# Patient Record
Sex: MALE | Race: AMERICAN INDIAN OR ALASKA NATIVE
[De-identification: names, ages, dates, MRNs, and addresses within clinical notes are randomized per-mention and may not be internally consistent; named-entity substitution may affect disease eponyms.]

---

## 2017-07-19 LAB
ALBUMIN SERPL-MCNC: 4.4 G/DL (ref 3.9–5)
ALBUMIN/GLOB SERPL: 1.3 %
ALP SERPL-CCNC: 40 UNITS/L (ref 35–129)
ALT SERPL-CCNC: 9 UNITS/L (ref 7–56)
ANION GAP SERPL CALC-SCNC: 22 MMOL/L
BASOPHILS NFR BLD AUTO: 0.6 % (ref 0–1.8)
BILIRUB SERPL-MCNC: 0.7 MG/DL (ref 0.1–1.2)
BUN SERPL-MCNC: 20 MG/DL (ref 9–20)
BUN/CREAT SERPL: 16.66 %
CALCIUM SERPL-MCNC: 10 MG/DL (ref 8.4–10.2)
CHLORIDE SERPL-SCNC: 99.1 MMOL/L (ref 98–107)
CO2 SERPL-SCNC: 26 MMOL/L (ref 22–30)
EOSINOPHIL NFR BLD AUTO: 0.6 % (ref 0–4.3)
GLUCOSE SERPL-MCNC: 96 MG/DL (ref 75–100)
HCT VFR BLD CALC: 48.7 % (ref 35.5–45.6)
HGB BLD-MCNC: 16.6 GM/DL (ref 11.8–15.2)
MCH RBC QN AUTO: 31 PG (ref 28–32)
MCHC RBC AUTO-ENTMCNC: 34 % (ref 32–34)
MCV RBC AUTO: 90 FL (ref 84–94)
PLATELET # BLD: 164 K/MM3 (ref 140–440)
POTASSIUM SERPL-SCNC: 5.7 MMOL/L (ref 3.6–5)
PROT SERPL-MCNC: 7.7 G/DL (ref 6.3–8.2)
RBC # BLD AUTO: 5.39 M/MM3 (ref 3.65–5.03)
SODIUM SERPL-SCNC: 141 MMOL/L (ref 137–145)
WBC # BLD AUTO: 7.8 K/MM3 (ref 4.5–11)

## 2017-07-19 NOTE — CAT SCAN REPORT
FINAL REPORT



PROCEDURE:  CT HEAD/BRAIN WO CON



TECHNIQUE:  Computerized tomography of the head was performed

without contrast material. 



HISTORY:  ams 



COMPARISON:  No prior studies are available for comparison.



FINDINGS:  

Skull and scalp: Normal.



Paranasal sinuses: Diffuse mucosal thickening is noted involving

bilateral frontal and ethmoid sinuses..



Ventricles and subarachnoid spaces: Normal.



Cerebrum: No evidence of hemorrhage, acute infarction or mass .



Cerebellum and brainstem: No evidence of hemorrhage, acute

infarction or mass.



Vasculature: Normal.



Comments: None.



IMPRESSION:  

No acute intracranial abnormality.



Chronic frontal and ethmoid sinusitis

## 2017-07-19 NOTE — EMERGENCY DEPARTMENT REPORT
ED Psych HPI





- General


Chief Complaint: Psych


Stated Complaint: AMS


Time Seen by Provider: 07/19/17 19:12


Source: police, EMS


Mode of arrival: Stretcher





- History of Present Illness


MD Complaint: other (acute psychosis)


-: Gradual


Associated Psychiatric Symptoms: other


Quality: intermittent


Improves With: none


Associated Symptoms: denies other symptoms


If Self Harm: other


Details of Plan: Patient was incarcerated, was brought to ED for acting 

strange.  Has not eaten or drank since he was arrested according to the 

officers 2 weeks ago.  Has a history of schizophrenia, is staring in the blank, 

not interacting with other people.





- Related Data


 Home Medications











 Medication  Instructions  Recorded  Confirmed  Last Taken


 


Unobtainable  07/20/17 07/20/17 Unknown











 Allergies











Allergy/AdvReac Type Severity Reaction Status Date / Time


 


No Known Allergies Allergy   Verified 07/19/17 19:46














ED Review of Systems


ROS: 


Stated complaint: AMS


Other details as noted in HPI





Comment: Unobtainable due to pts medical conditions





ED Past Medical Hx





- Past Medical History


Hx Psychiatric Treatment: Yes





- Surgical History


Past Surgical History?: No





- Family History


Family history: hypertension





- Social History


Smoking Status: Unknown if ever smoked





- Medications


Home Medications: 


 Home Medications











 Medication  Instructions  Recorded  Confirmed  Last Taken  Type


 


Unobtainable  07/20/17 07/20/17 Unknown History














ED Physical Exam





- General


Limitations: Altered Mental Status


General appearance: alert





- Head


Head exam: Present: atraumatic





- Eye


Eye exam: Present: normal appearance, PERRL, EOMI


Pupils: Present: normal accommodation





- ENT


ENT exam: Present: normal exam, normal orophraynx





- Neck


Neck exam: Present: normal inspection





- Respiratory


Respiratory exam: Present: normal lung sounds bilaterally





- Cardiovascular


Cardiovascular Exam: Present: regular rate, normal rhythm





- GI/Abdominal


GI/Abdominal exam: Present: soft





- Extremities Exam


Extremities exam: Present: normal inspection





- Back Exam


Back exam: Present: normal inspection





- Neurological Exam


Neurological exam: Present: alert, altered, oriented X3





- Psychiatric


Psychiatric exam: Present: depressed, agitated, flat affect





- Skin


Skin exam: Present: warm, dry





ED Course


 Vital Signs











  07/19/17 07/19/17 07/19/17





  18:47 18:52 21:17


 


Temperature 99.0 F  


 


Pulse Rate 88  74


 


Respiratory  16 18





Rate   


 


Blood Pressure 112/87  


 


Blood Pressure   114/79





[Left]   


 


Blood Pressure   





[Right]   


 


O2 Sat by Pulse 98  98





Oximetry   














  07/20/17 07/20/17 07/20/17





  18:21 18:22 20:04


 


Temperature 98.4 F  


 


Pulse Rate 107 H  81


 


Respiratory 16 16 18





Rate   


 


Blood Pressure   


 


Blood Pressure 127/84  83/57





[Left]   


 


Blood Pressure   





[Right]   


 


O2 Sat by Pulse 100 100 99





Oximetry   














  07/21/17 07/21/17 07/21/17





  08:34 13:58 18:40


 


Temperature 98 F 98.6 F 98.1 F


 


Pulse Rate 77 72 78


 


Respiratory 16 14 14





Rate   


 


Blood Pressure   


 


Blood Pressure 108/57 110/62 99/65





[Left]   


 


Blood Pressure   





[Right]   


 


O2 Sat by Pulse 100 96 97





Oximetry   














  07/21/17 07/22/17 07/23/17





  20:24 22:00 11:34


 


Temperature 98.5 F 98.8 F 98.6 F


 


Pulse Rate 99 H 68 70


 


Respiratory 20 18 20





Rate   


 


Blood Pressure   


 


Blood Pressure 120/64 104/65 110/70





[Left]   


 


Blood Pressure   





[Right]   


 


O2 Sat by Pulse 99 98 100





Oximetry   














  07/23/17 07/24/17 07/24/17





  22:00 07:54 07:55


 


Temperature 97.8 F 98.4 F 


 


Pulse Rate 59 L 60 


 


Respiratory 16 18 18





Rate   


 


Blood Pressure   


 


Blood Pressure 116/76 92/60 





[Left]   


 


Blood Pressure   





[Right]   


 


O2 Sat by Pulse 100 100 100





Oximetry   














  07/24/17 07/26/17 07/26/17





  20:38 08:35 12:40


 


Temperature 98.6 F 97.8 F 


 


Pulse Rate 64 80 


 


Respiratory 16 18 18





Rate   


 


Blood Pressure   


 


Blood Pressure   





[Left]   


 


Blood Pressure 116/72 111/67 





[Right]   


 


O2 Sat by Pulse 100 98 98





Oximetry   














  07/26/17 07/27/17 07/27/17





  20:08 06:12 10:00


 


Temperature 98 F 98 F 


 


Pulse Rate 78 84 


 


Respiratory 18 18 18





Rate   


 


Blood Pressure   


 


Blood Pressure   





[Left]   


 


Blood Pressure 117/68 114/72 





[Right]   


 


O2 Sat by Pulse 100 98 98





Oximetry   














  07/27/17 07/27/17 07/28/17





  19:58 20:00 10:00


 


Temperature 97.6 F  98 F


 


Pulse Rate 74  73


 


Respiratory 18 19 18





Rate   


 


Blood Pressure   


 


Blood Pressure   





[Left]   


 


Blood Pressure 113/72  97/67





[Right]   


 


O2 Sat by Pulse 99  98





Oximetry   














  07/28/17 07/28/17 07/29/17





  12:43 22:00 07:45


 


Temperature  97.9 F 


 


Pulse Rate  99 H 


 


Respiratory 19 18 16





Rate   


 


Blood Pressure   


 


Blood Pressure   





[Left]   


 


Blood Pressure  107/80 





[Right]   


 


O2 Sat by Pulse 99 99 





Oximetry   














  07/29/17





  11:34


 


Temperature 98 F


 


Pulse Rate 91 H


 


Respiratory 16





Rate 


 


Blood Pressure 


 


Blood Pressure 





[Left] 


 


Blood Pressure 106/75





[Right] 


 


O2 Sat by Pulse 98





Oximetry 














- Reevaluation(s)


Reevaluation #1: 





07/20/17 01:15


While in the ED, police officers advise nursing staff the patient has posted 

bond, will be released.  Due to his apparent psychosis will place on 1013 

awaiting psych consultation.  


Medically clear for psych evaluation.





ED Medical Decision Making





- Lab Data


Result diagrams: 


 07/19/17 20:44





 07/23/17 16:11


Critical care attestation.: 


If time is entered above; I have spent that time in minutes in the direct care 

of this critically ill patient, excluding procedure time.








ED Disposition


Clinical Impression: 


 Acute psychosis





Disposition: DC/TX-[facility] PS[facility] HOSP/PSY UNIT


Is pt being admited?: No


Does the pt Need Aspirin: No


Condition: Stable


Referrals: 


PRIMARY CARE,MD [Primary Care Provider] - 3-5 Days

## 2017-07-20 LAB
BILIRUB UR QL STRIP: (no result)
BLOOD UR QL VISUAL: (no result)
KETONES UR STRIP-MCNC: 20 MG/DL
LEUKOCYTE ESTERASE UR QL STRIP: (no result)
MUCOUS THREADS #/AREA URNS HPF: (no result) /HPF
NITRITE UR QL STRIP: (no result)
PH UR STRIP: 5 [PH] (ref 5–7)
RBC #/AREA URNS HPF: 3 /HPF (ref 0–6)
URINE DRUGS OF ABUSE NOTE: (no result)
UROBILINOGEN UR-MCNC: 4 MG/DL (ref ?–2)
WBC #/AREA URNS HPF: 3 /HPF (ref 0–6)

## 2017-07-20 NOTE — CONSULTATION
History of Present Illness





- Reason for Consult


Consult date: 07/20/17


Reason for consult: Mental Health Evaluation


Requesting physician: VIOLETA FORTUNE





- Chief Complaint


Chief complaint: 


"Patient nonverbal"








- History of Present Psychiatric Illness


27 y.o. black male incarcerated brought to ED for bizarre behavior. When I 

arrived to his room, patient turned his head. He would not answer to his name 

or questions asked of him. No gestures of SI/HI's. Patient refused his 

breakfast this morning. 








Medications and Allergies


 Allergies











Allergy/AdvReac Type Severity Reaction Status Date / Time


 


No Known Allergies Allergy   Verified 07/19/17 19:46











 Home Medications











 Medication  Instructions  Recorded  Confirmed  Last Taken  Type


 


No Known Home Medications [No  07/22/15 07/22/15 Unknown History





Reported Home Medications]     














Past psychiatric history





- Past Medical History


Past Medical History: other (Unable to obtain)


Past Surgical History: Other (unable to obtain)





- Social History


Social history: other (unable to obtain)





Mental Status Exam





- Vital signs


 Last Vital Signs











Temp  99.0 F   07/19/17 18:47


 


Pulse  74   07/19/17 21:17


 


Resp  18   07/19/17 21:17


 


BP  114/79   07/19/17 21:17


 


Pulse Ox  98   07/19/17 21:17














- Exam


Narrative exam: 


Unable to complete MSE.








Results


Result Diagrams: 


 07/19/17 20:44





 07/19/17 20:44


 Abnormal lab results











  07/19/17 07/19/17 07/19/17 Range/Units





  20:44 20:44 20:44 


 


RBC  5.39 H    (3.65-5.03)  M/mm3


 


Hgb  16.6 H    (11.8-15.2)  gm/dl


 


Hct  48.7 H    (35.5-45.6)  %


 


Seg Neutrophils %  76.9 H    (40.0-70.0)  %


 


Potassium   5.7 H   (3.6-5.0)  mmol/L


 


Total Creatine Kinase    191 H  ()  units/L








All other labs normal.








Assessment and Plan


Assessment and plan: 


Impression: Possible psychosis. He would not answer to his or any other 

questions. No gestures of SI/HI's. Patient refusing meals. 





Recommendation/Plan: Continue 1013 with placement to inpatient psy services. 

Gather collateral information to determine proper dispo. Monitor patient for 

dehydration and hypoglycemia.

## 2017-07-20 NOTE — XRAY REPORT
CHEST ONE VIEW



INDICATION: Psychiatric medical clearance.



COMPARISON: None similar.



FINDINGS: Portable, frontal chest radiographs, 2 images, demonstrate 

normal cardiomediastinal silhouette. Clear lungs. Unremarkable bones.



CONCLUSION: No acute disease in the chest.



Thank you for the opportunity to participate in this patient's care.

## 2017-07-21 NOTE — PROGRESS NOTE
Subjective





- Reason for Consult


Consult date: 07/21/17


Reason for consult: follow up





- Chief Complaint


Chief complaint: 


nonverbal





27 y.o. black male who was  incarcerated was brought to ED for bizarre 

behavior. He was observed lying on the floor and later in his bed with his head 

turned away. He would not answer to his name or questions asked of him. No 

gestures of SI/HI's. 


























Mental Status Exam





- Vital signs


 Last Vital Signs











Temp  98.6 F   07/21/17 13:58


 


Pulse  72   07/21/17 13:58


 


Resp  14   07/21/17 13:58


 


BP  110/62   07/21/17 13:58


 


Pulse Ox  96   07/21/17 13:58














- Exam


Narrative exam: 





unable to obtain


patient uncooperative


no eye contact





Assessment and Plan





Impression: Possible psychosis. He would not answer to his or any other 

questions. No gestures of SI/HI. Patient refusing meals. 





Recommendation/Plan: Continue 1013 with placement to inpatient psy services. 

Gather collateral information to determine proper dispo. Monitor patient for 

dehydration and hypoglycemia.

## 2017-07-22 RX ADMIN — LORAZEPAM SCH: 1 TABLET ORAL at 17:40

## 2017-07-22 RX ADMIN — LORAZEPAM SCH MG: 1 TABLET ORAL at 21:36

## 2017-07-22 NOTE — PROGRESS NOTE
Subjective





- Reason for Consult


Consult date: 07/22/17


Reason for consult: follow up





- Chief Complaint


Chief complaint: 


nonverbal





27 y.o. black male who was  incarcerated was brought to ED for bizarre 

behavior.  He would not answer to his name or questions asked of him. He makes 

eye contact and turns his head in response to attempted interaction. No 

gestures of SI/HI.  CK is 191. Staff report he is eating.


























Mental Status Exam





- Vital signs


 Last Vital Signs











Temp  98.5 F   07/21/17 20:24


 


Pulse  99 H  07/21/17 20:24


 


Resp  20   07/21/17 20:24


 


BP  120/64   07/21/17 20:24


 


Pulse Ox  99   07/21/17 20:24














Assessment and Plan





Impression: Possible psychosis. He would not answer to his or any other 

questions. No gestures of SI/HI. Staff report he is eating. 





Recommendation/Plan: Continue 1013 with placement to inpatient psy services. 

Gather collateral information to determine proper dispo. Monitor patient for 

dehydration and hypoglycemia.

## 2017-07-23 LAB
ANION GAP SERPL CALC-SCNC: (no result) MMOL/L
ANION GAP SERPL CALC-SCNC: 19 MMOL/L
BUN SERPL-MCNC: (no result) MG/DL (ref 9–20)
BUN SERPL-MCNC: 14 MG/DL (ref 9–20)
BUN/CREAT SERPL: (no result) %
BUN/CREAT SERPL: 14 %
CALCIUM SERPL-MCNC: (no result) MG/DL (ref 8.4–10.2)
CALCIUM SERPL-MCNC: 9.3 MG/DL (ref 8.4–10.2)
CHLORIDE SERPL-SCNC: (no result) MMOL/L (ref 98–107)
CHLORIDE SERPL-SCNC: 100.9 MMOL/L (ref 98–107)
CK SERPL-CCNC: (no result) UNITS/L (ref 55–170)
CK SERPL-CCNC: 423 UNITS/L (ref 55–170)
CO2 SERPL-SCNC: (no result) MMOL/L (ref 22–30)
CO2 SERPL-SCNC: 28 MMOL/L (ref 22–30)
GLUCOSE SERPL-MCNC: (no result) MG/DL (ref 75–100)
GLUCOSE SERPL-MCNC: 91 MG/DL (ref 75–100)
POTASSIUM SERPL-SCNC: (no result) MMOL/L (ref 3.6–5)
POTASSIUM SERPL-SCNC: 4.2 MMOL/L (ref 3.6–5)
SODIUM SERPL-SCNC: (no result) MMOL/L (ref 137–145)
SODIUM SERPL-SCNC: 144 MMOL/L (ref 137–145)

## 2017-07-23 RX ADMIN — LORAZEPAM SCH: 1 TABLET ORAL at 20:00

## 2017-07-23 RX ADMIN — LORAZEPAM SCH: 1 TABLET ORAL at 14:47

## 2017-07-23 RX ADMIN — LORAZEPAM SCH: 1 TABLET ORAL at 13:19

## 2017-07-24 ENCOUNTER — HOSPITAL ENCOUNTER (EMERGENCY)
Dept: HOSPITAL 5 - ED | Age: 27
LOS: 5 days | Discharge: PSYCHIATRIC OR SUBSTANCE USE FACILITY | End: 2017-07-29
Payer: COMMERCIAL

## 2017-07-24 DIAGNOSIS — F20.9: ICD-10-CM

## 2017-07-24 DIAGNOSIS — F23: Primary | ICD-10-CM

## 2017-07-24 PROCEDURE — 80053 COMPREHEN METABOLIC PANEL: CPT

## 2017-07-24 PROCEDURE — 85025 COMPLETE CBC W/AUTO DIFF WBC: CPT

## 2017-07-24 PROCEDURE — G0480 DRUG TEST DEF 1-7 CLASSES: HCPCS

## 2017-07-24 PROCEDURE — 80048 BASIC METABOLIC PNL TOTAL CA: CPT

## 2017-07-24 PROCEDURE — 81001 URINALYSIS AUTO W/SCOPE: CPT

## 2017-07-24 PROCEDURE — 99285 EMERGENCY DEPT VISIT HI MDM: CPT

## 2017-07-24 PROCEDURE — 70450 CT HEAD/BRAIN W/O DYE: CPT

## 2017-07-24 PROCEDURE — 71010: CPT

## 2017-07-24 PROCEDURE — 84443 ASSAY THYROID STIM HORMONE: CPT

## 2017-07-24 PROCEDURE — 80320 DRUG SCREEN QUANTALCOHOLS: CPT

## 2017-07-24 PROCEDURE — 96372 THER/PROPH/DIAG INJ SC/IM: CPT

## 2017-07-24 PROCEDURE — 82550 ASSAY OF CK (CPK): CPT

## 2017-07-24 PROCEDURE — 82553 CREATINE MB FRACTION: CPT

## 2017-07-24 PROCEDURE — 80178 ASSAY OF LITHIUM: CPT

## 2017-07-24 PROCEDURE — 36415 COLL VENOUS BLD VENIPUNCTURE: CPT

## 2017-07-24 PROCEDURE — 80307 DRUG TEST PRSMV CHEM ANLYZR: CPT

## 2017-07-24 RX ADMIN — LORAZEPAM SCH MG: 1 TABLET ORAL at 22:53

## 2017-07-24 RX ADMIN — LORAZEPAM SCH MG: 1 TABLET ORAL at 14:47

## 2017-07-24 RX ADMIN — LORAZEPAM SCH MG: 1 TABLET ORAL at 09:26

## 2017-07-24 NOTE — PROGRESS NOTE
Subjective





- Reason for Consult


Consult date: 07/24/17


Reason for consult: Psychiatry Follow-up





- Chief Complaint


Chief complaint: 


"Hello" 





27 y.o. black male who was  incarcerated was brought to ED for bizarre 

behavior. Today patient is calm, but still uncooperative. He would nod to "yes" 

when I asked him did he eat his meals yesterday. I decided to asked him more 

questions and the patient turned his head away and stared at the wall. Per his 

mother Naina Ashraf, her son has a hx of schizophrenia paranoid type. She 

stated that her son received an injection Jan 2017 in [place], but don't 

know the name of the medication. Patient did take his PO medication this 

morning. I observed patient ambulating in his room. Per the staff, no 

behavioral disturbances overnight. No gestures of SI/HI's. 























Mental Status Exam





- Vital signs


 Last Vital Signs











Temp  98.4 F   07/24/17 07:54


 


Pulse  60   07/24/17 07:54


 


Resp  18   07/24/17 07:55


 


BP  92/60   07/24/17 07:54


 


Pulse Ox  100   07/24/17 07:55














- Exam


Narrative exam: 


Unable to complete MSE.








Assessment and Plan


Impression: Historical Dx: Schizophrenia paranoid type. Likely psychosis and 

Catatonia. Today patient is calm, but still uncooperative. No gestures of SI/HI'

s. Staff report he is eating.  trending up.





Recommendation/Plan: Continue 1013 with placement to inpatient psy services. 

Monitor patient for dehydration and hypoglycemia. Modify Ativan to 1 mg PO QID 

for catatonia. Staff informed to encourage patient to take Ativan. Recommend GI/

DVT prophylaxis.

## 2017-07-25 RX ADMIN — LORAZEPAM SCH MG: 1 TABLET ORAL at 14:25

## 2017-07-25 RX ADMIN — LORAZEPAM SCH MG: 1 TABLET ORAL at 10:20

## 2017-07-25 RX ADMIN — LORAZEPAM SCH MG: 1 TABLET ORAL at 18:24

## 2017-07-25 NOTE — PROGRESS NOTE
Subjective





- Reason for Consult


Consult date: 07/25/17


Reason for consult: Psychiatry Follow-up





- Chief Complaint


Chief complaint: 


"Hello" 





27 y.o. black male who was  incarcerated was brought to ED for bizarre 

behavior. Today patient is calm and cooperative, but would was nonverbal. He 

would nod to questions asked of him. He nodded "yes" to eating all his meals, 

nodded  "no" to SI/HI's, AVH's and wanting to shower. Per his assigned RN, she 

stated having a conversation with his earlier in the day. Patient is compliant 

with taking his PO medications. 




















Mental Status Exam





- Vital signs


 Last Vital Signs











Temp  98.6 F   07/24/17 20:38


 


Pulse  64   07/24/17 20:38


 


Resp  16   07/24/17 20:38


 


BP  116/72   07/24/17 20:38


 


Pulse Ox  100   07/24/17 20:38














- Exam


Narrative exam: 


Unable to complete the MSE.











Assessment and Plan


Impression: Historical Dx: Schizophrenia paranoid type. Likely psychosis and 

Catatonia. Today patient is calm and cooperative. Denies SI/HI's. Patient 

acknowledge eating his meals.  





Recommendation/Plan: Continue 1013 with placement to inpatient psy services. 

Monitor patient for dehydration and hypoglycemia. Modify Ativan to 1 mg PO QID 

for catatonia. Staff informed to encourage patient to take Ativan. Recommend GI/

DVT prophylaxis. CK ordered.

## 2017-07-26 RX ADMIN — LORAZEPAM SCH MG: 1 TABLET ORAL at 19:11

## 2017-07-26 RX ADMIN — LORAZEPAM SCH MG: 1 TABLET ORAL at 10:48

## 2017-07-26 RX ADMIN — LORAZEPAM SCH MG: 1 TABLET ORAL at 14:36

## 2017-07-26 RX ADMIN — LORAZEPAM SCH MG: 1 TABLET ORAL at 22:41

## 2017-07-26 NOTE — PROGRESS NOTE
Subjective





- Reason for Consult


Consult date: 07/26/17


Reason for consult: follow up





- Chief Complaint


Chief complaint: 


"I just didn't want to talk." 





27 y.o. black male who was  incarcerated was brought to ED for bizarre 

behavior. Today patient is calm and stood up when spoken to. When asked about 

his lack of speech for the past few days, he stated "I just didn't want to talk.

" He declines to take a shower. Throughout the interview he took his gown on 

and off exposing himself.  Patient is compliant with taking his PO medications. 

He denies suicidal, homicidal, and AVH.


He kept turning to look in the back of the room..




















Mental Status Exam





- Vital signs


 Last Vital Signs











Temp  97.8 F   07/26/17 08:35


 


Pulse  80   07/26/17 08:35


 


Resp  18   07/26/17 12:40


 


BP  111/67   07/26/17 08:35


 


Pulse Ox  98   07/26/17 12:40














Assessment and Plan





MSE:


patient minimally cooperative


minimally verbal


He denies suicidal, homicidal, and AVH.


He kept turning to look in the back of the room.








Impression: Historical Dx: Schizophrenia paranoid type. Likely psychosis and 

Catatonia. Today patient is calm and cooperative. Denies SI/HI's. Patient 

acknowledge eating his meals.  








Recommendation/Plan: Continue 1013 with placement to inpatient psy services. 

Continue Ativan to 1 mg PO QID for catatonia. 


Consider antipsychotic

## 2017-07-27 RX ADMIN — LORAZEPAM SCH MG: 1 TABLET ORAL at 20:54

## 2017-07-27 NOTE — PROGRESS NOTE
Subjective





- Reason for Consult


Consult date: 07/27/17


Reason for consult: Psychiatry Follow-up





- Chief Complaint


Chief complaint: 


"I don't know" 





27 y.o. black male who was  incarcerated was brought to ED for bizarre 

behavior. Today patient is calm, but uncooperative. He did stated 'I don't know

" when I asked him about eating his meals. I observed the patient lying on the 

floor covered with his blanket and gown. He would not remove the the blanket 

and gown from his face. Patient would not answer anymore questions when asked. 

No gestures of SI/HI's and AVH's. Patient is compliant with his PO medication. 




















Mental Status Exam





- Vital signs


 Last Vital Signs











Temp  98 F   07/27/17 06:12


 


Pulse  84   07/27/17 06:12


 


Resp  18   07/27/17 06:12


 


BP  114/72   07/27/17 06:12


 


Pulse Ox  98   07/27/17 06:12














- Exam


Narrative exam: 


MSE:





 Appearance: calm, uncooperative


 Behavior: poor eye contact


 Speech: regular rate and tone


 Mood: unable to assess


 Affect: flat


 Thought Process: unable to assess 


 Thought Content: No gestures SI/HI's and AVH's


 Motor Activity: lying on the floor 


 Cognition: unable to assess


 Insight: unable to assess


 Judgment: unable to assess





Assessment and Plan


Impression: Historical Dx: Schizophrenia paranoid type. Possibly Psychosis and 

Catatonia. Today patient is calm, but uncooperative. No gestures of SI/HI's. CK 

338.





Recommendation/Plan: Continue 1013 with placement to inpatient psy services. 

Monitor patient for dehydration and hypoglycemia. Modify Ativan to 2 mg PO TID 

for catatonia. Recommend GI/DVT prophylaxis. Consider a antipsychotic.

## 2017-07-28 RX ADMIN — LORAZEPAM SCH MG: 1 TABLET ORAL at 15:25

## 2017-07-28 RX ADMIN — LORAZEPAM SCH MG: 1 TABLET ORAL at 10:26

## 2017-07-28 RX ADMIN — LORAZEPAM SCH: 1 TABLET ORAL at 20:29

## 2017-07-28 NOTE — PROGRESS NOTE
Subjective





- Reason for Consult


Consult date: 07/28/17


Reason for consult: follow up





- Chief Complaint


Chief complaint: 


"I don't know" 





27 y.o. black male who was  incarcerated was brought to ED for bizarre 

behavior. Today patient is calm, but uncooperative. He ate his breakfast. He 

was arguing with  about wanting respect. He took his medication 

by mouth. I observed the patient lying on the floor covered with his blanket 

and gown prior to that. His nurse today reports he frequently exposes himself. 

Unable to obtain SI/HI or perceptual disturbances.

















Mental Status Exam





- Vital signs


 Last Vital Signs











Temp  97.6 F   07/27/17 19:58


 


Pulse  74   07/27/17 19:58


 


Resp  19   07/27/17 20:00


 


BP  113/72   07/27/17 19:58


 


Pulse Ox  99   07/27/17 19:58














- Exam


Narrative exam: 





unable to obtain


patient uncooperative


no eye contact


argumentative





Assessment and Plan





MSE:


patient minimally cooperative


more verbal today


Unable to obtain suicidal, homicidal, and AVH.


Poor hygiene








Impression: Historical Dx: Schizophrenia paranoid type. Likely psychosis (

continues )and Catatonia (resolving). Today patient is calm and cooperative. 

Denies SI/HI's. Patient's meal tray is empty with evidence he had eaten it.








Recommendation/Plan: Continue 1013 with placement to inpatient psy services. 

Continue Ativan to 1 mg PO QID for catatonia. 


Consider antipsychotic

## 2017-07-29 VITALS — DIASTOLIC BLOOD PRESSURE: 75 MMHG | SYSTOLIC BLOOD PRESSURE: 106 MMHG

## 2018-05-08 ENCOUNTER — HOSPITAL ENCOUNTER (EMERGENCY)
Dept: HOSPITAL 5 - ED | Age: 28
LOS: 1 days | Discharge: PSYCHIATRIC OR SUBSTANCE USE FACILITY | End: 2018-05-09
Payer: COMMERCIAL

## 2018-05-08 DIAGNOSIS — F29: ICD-10-CM

## 2018-05-08 DIAGNOSIS — F94.0: Primary | ICD-10-CM

## 2018-05-08 LAB
BASOPHILS # (AUTO): 0 K/MM3 (ref 0–0.1)
BASOPHILS NFR BLD AUTO: 0.4 % (ref 0–1.8)
BENZODIAZEPINES SCREEN,URINE: (no result)
BILIRUB UR QL STRIP: (no result)
BLOOD UR QL VISUAL: (no result)
BUN SERPL-MCNC: 12 MG/DL (ref 9–20)
BUN/CREAT SERPL: 13 %
CALCIUM SERPL-MCNC: 9.5 MG/DL (ref 8.4–10.2)
EOSINOPHIL # BLD AUTO: 0.1 K/MM3 (ref 0–0.4)
EOSINOPHIL NFR BLD AUTO: 0.8 % (ref 0–4.3)
HCT VFR BLD CALC: 48.6 % (ref 35.5–45.6)
HEMOLYSIS INDEX: 13
HGB BLD-MCNC: 15.7 GM/DL (ref 11.8–15.2)
LYMPHOCYTES # BLD AUTO: 0.9 K/MM3 (ref 1.2–5.4)
LYMPHOCYTES NFR BLD AUTO: 12.5 % (ref 13.4–35)
MCH RBC QN AUTO: 30 PG (ref 28–32)
MCHC RBC AUTO-ENTMCNC: 32 % (ref 32–34)
MCV RBC AUTO: 93 FL (ref 84–94)
METHADONE SCREEN,URINE: (no result)
MONOCYTES # (AUTO): 0.6 K/MM3 (ref 0–0.8)
MONOCYTES % (AUTO): 7.5 % (ref 0–7.3)
MUCOUS THREADS #/AREA URNS HPF: (no result) /HPF
OPIATE SCREEN,URINE: (no result)
PH UR STRIP: 5 [PH] (ref 5–7)
PLATELET # BLD: 233 K/MM3 (ref 140–440)
RBC # BLD AUTO: 5.21 M/MM3 (ref 3.65–5.03)
RBC #/AREA URNS HPF: 7 /HPF (ref 0–6)
UROBILINOGEN UR-MCNC: 4 MG/DL (ref ?–2)
WBC #/AREA URNS HPF: 6 /HPF (ref 0–6)

## 2018-05-08 PROCEDURE — 99285 EMERGENCY DEPT VISIT HI MDM: CPT

## 2018-05-08 PROCEDURE — 36415 COLL VENOUS BLD VENIPUNCTURE: CPT

## 2018-05-08 PROCEDURE — 80048 BASIC METABOLIC PNL TOTAL CA: CPT

## 2018-05-08 PROCEDURE — 80320 DRUG SCREEN QUANTALCOHOLS: CPT

## 2018-05-08 PROCEDURE — 85025 COMPLETE CBC W/AUTO DIFF WBC: CPT

## 2018-05-08 PROCEDURE — G0480 DRUG TEST DEF 1-7 CLASSES: HCPCS

## 2018-05-08 PROCEDURE — 80307 DRUG TEST PRSMV CHEM ANLYZR: CPT

## 2018-05-08 PROCEDURE — 81001 URINALYSIS AUTO W/SCOPE: CPT

## 2018-05-08 NOTE — EMERGENCY DEPARTMENT REPORT
ED Psych HPI





- General


Chief Complaint: Psych


Stated Complaint: 1013


Time Seen by Provider: 05/08/18 16:27


Source: EMS


Mode of arrival: Stretcher


Limitations: No Limitations





- History of Present Illness


Initial Comments: 





28-year-old male with a a significant psychiatric history presents to the 

Hospital with abnormal behavior.  Patient was found outside walking around with 

his shirt off and pacing.  Presented with a signed 1013.  As per this 1013 

patient patient has not been on medications since December has been delusional 

and pacing around without clubbing for several days.  He intermittently 

responds to questions and is mostly nonresponsive.  





As per Medical record review he was seen in the ER July 2017 for similar 

symptoms and catatonic behavior [facility]





- Related Data


 Home Medications











 Medication  Instructions  Recorded  Confirmed  Last Taken


 


Unobtainable  07/20/17 05/08/18 Unknown











 Allergies











Allergy/AdvReac Type Severity Reaction Status Date / Time


 


No Known Allergies Allergy   Verified 05/08/18 15:33














ED Review of Systems


ROS: 


Stated complaint: 1013


Other details as noted in HPI





Comment: Unobtainable due to pts medical conditions





ED Past Medical Hx





- Past Medical History


Previous Medical History?: Yes


Hx Psychiatric Treatment: Yes





- Surgical History


Past Surgical History?: No





- Social History


Smoking Status: Never Smoker





- Medications


Home Medications: 


 Home Medications











 Medication  Instructions  Recorded  Confirmed  Last Taken  Type


 


Unobtainable  07/20/17 05/08/18 Unknown History














ED Physical Exam





- General


Limitations: No Limitations





- Other


Other exam information: 





General: No limitations, patient is alert in no acute distress


Head exam: Atraumatic, normocephalic


Eyes exam: Normal appearance, pupils equal reactive to light, extraocular 

movements intact


ENT: Moist mucous membrane, normal oropharynx


Neck exam: Normal inspection, full range of motion, no meningismus nontender


Respiratory exam: Clear to auscultation bilateral, no wheezes, rales, crackles


Cardiovascular: Normal rate and rhythm, normal heart sounds


Abdomen: Soft, nondistended, and  nontender, with normal bowel sounds, no 

rebound, or guarding


Extremity: Full range of motion normal inspection no deformity


Back: Normal Inspection, full range of motion, no tenderness


Neurologic: Alert, speech clear when he decides to speak, no facial droop, no 

motor or sensory deficit


Psychiatric: Flat affect


Skin: Warm, dry, intact





ED Course


 Vital Signs











  05/08/18 05/08/18





  15:41 15:53


 


Temperature 98.6 F 


 


Pulse Rate 100 H 


 


Respiratory 16 16





Rate  


 


Blood Pressure 114/76 


 


O2 Sat by Pulse 100 100





Oximetry  














ED Medical Decision Making





- Lab Data


Result diagrams: 


 05/08/18 15:35





 05/08/18 15:35








 Lab Results











  05/08/18 05/08/18 05/08/18 Range/Units





  15:32 15:32 15:35 


 


WBC     (4.5-11.0)  K/mm3


 


RBC     (3.65-5.03)  M/mm3


 


Hgb     (11.8-15.2)  gm/dl


 


Hct     (35.5-45.6)  %


 


MCV     (84-94)  fl


 


MCH     (28-32)  pg


 


MCHC     (32-34)  %


 


RDW     (13.2-15.2)  %


 


Plt Count     (140-440)  K/mm3


 


Lymph % (Auto)     (13.4-35.0)  %


 


Mono % (Auto)     (0.0-7.3)  %


 


Eos % (Auto)     (0.0-4.3)  %


 


Baso % (Auto)     (0.0-1.8)  %


 


Lymph #     (1.2-5.4)  K/mm3


 


Mono #     (0.0-0.8)  K/mm3


 


Eos #     (0.0-0.4)  K/mm3


 


Baso #     (0.0-0.1)  K/mm3


 


Seg Neutrophils %     (40.0-70.0)  %


 


Seg Neutrophils #     (1.8-7.7)  K/mm3


 


Sodium     (137-145)  mmol/L


 


Potassium     (3.6-5.0)  mmol/L


 


Chloride     ()  mmol/L


 


Carbon Dioxide     (22-30)  mmol/L


 


Anion Gap     mmol/L


 


BUN     (9-20)  mg/dL


 


Creatinine     (0.8-1.5)  mg/dL


 


Estimated GFR     ml/min


 


BUN/Creatinine Ratio     %


 


Glucose     ()  mg/dL


 


Calcium     (8.4-10.2)  mg/dL


 


Urine Color  Yellow    (Yellow)  


 


Urine Turbidity  Clear    (Clear)  


 


Urine pH  5.0    (5.0-7.0)  


 


Ur Specific Gravity  1.030    (1.003-1.030)  


 


Urine Protein  30 mg/dl    (Negative)  mg/dL


 


Urine Glucose (UA)  Neg    (Negative)  mg/dL


 


Urine Ketones  20    (Negative)  mg/dL


 


Urine Blood  Neg    (Negative)  


 


Urine Nitrite  Neg    (Negative)  


 


Urine Bilirubin  Neg    (Negative)  


 


Urine Urobilinogen  4.0    (<2.0)  mg/dL


 


Ur Leukocyte Esterase  Neg    (Negative)  


 


Urine WBC (Auto)  6.0    (0.0-6.0)  /HPF


 


Urine RBC (Auto)  7.0    (0.0-6.0)  /HPF


 


Urine Mucus  3+    /HPF


 


Salicylates    < 0.3 L  (2.8-20.0)  mg/dL


 


Urine Opiates Screen   Presumptive negative   


 


Urine Methadone Screen   Presumptive negative   


 


Acetaminophen     (10.0-30.0)  ug/mL


 


Ur Barbiturates Screen   Presumptive negative   


 


Ur Phencyclidine Scrn   Presumptive negative   


 


Ur Amphetamines Screen   Presumptive negative   


 


U Benzodiazepines Scrn   Presumptive negative   


 


Urine Cocaine Screen   Presumptive negative   


 


U Marijuana (THC) Screen   Presumptive positive   


 


Drugs of Abuse Note   Disclamer   


 


Plasma/Serum Alcohol     (0-0.07)  %














  05/08/18 05/08/18 05/08/18 Range/Units





  15:35 15:35 15:35 


 


WBC     (4.5-11.0)  K/mm3


 


RBC     (3.65-5.03)  M/mm3


 


Hgb     (11.8-15.2)  gm/dl


 


Hct     (35.5-45.6)  %


 


MCV     (84-94)  fl


 


MCH     (28-32)  pg


 


MCHC     (32-34)  %


 


RDW     (13.2-15.2)  %


 


Plt Count     (140-440)  K/mm3


 


Lymph % (Auto)     (13.4-35.0)  %


 


Mono % (Auto)     (0.0-7.3)  %


 


Eos % (Auto)     (0.0-4.3)  %


 


Baso % (Auto)     (0.0-1.8)  %


 


Lymph #     (1.2-5.4)  K/mm3


 


Mono #     (0.0-0.8)  K/mm3


 


Eos #     (0.0-0.4)  K/mm3


 


Baso #     (0.0-0.1)  K/mm3


 


Seg Neutrophils %     (40.0-70.0)  %


 


Seg Neutrophils #     (1.8-7.7)  K/mm3


 


Sodium   139   (137-145)  mmol/L


 


Potassium   3.5 L   (3.6-5.0)  mmol/L


 


Chloride   97.9 L   ()  mmol/L


 


Carbon Dioxide   27   (22-30)  mmol/L


 


Anion Gap   18   mmol/L


 


BUN   12   (9-20)  mg/dL


 


Creatinine   0.9   (0.8-1.5)  mg/dL


 


Estimated GFR   > 60   ml/min


 


BUN/Creatinine Ratio   13   %


 


Glucose   89   ()  mg/dL


 


Calcium   9.5   (8.4-10.2)  mg/dL


 


Urine Color     (Yellow)  


 


Urine Turbidity     (Clear)  


 


Urine pH     (5.0-7.0)  


 


Ur Specific Gravity     (1.003-1.030)  


 


Urine Protein     (Negative)  mg/dL


 


Urine Glucose (UA)     (Negative)  mg/dL


 


Urine Ketones     (Negative)  mg/dL


 


Urine Blood     (Negative)  


 


Urine Nitrite     (Negative)  


 


Urine Bilirubin     (Negative)  


 


Urine Urobilinogen     (<2.0)  mg/dL


 


Ur Leukocyte Esterase     (Negative)  


 


Urine WBC (Auto)     (0.0-6.0)  /HPF


 


Urine RBC (Auto)     (0.0-6.0)  /HPF


 


Urine Mucus     /HPF


 


Salicylates     (2.8-20.0)  mg/dL


 


Urine Opiates Screen     


 


Urine Methadone Screen     


 


Acetaminophen  < 5.0 L    (10.0-30.0)  ug/mL


 


Ur Barbiturates Screen     


 


Ur Phencyclidine Scrn     


 


Ur Amphetamines Screen     


 


U Benzodiazepines Scrn     


 


Urine Cocaine Screen     


 


U Marijuana (THC) Screen     


 


Drugs of Abuse Note     


 


Plasma/Serum Alcohol    < 0.01  (0-0.07)  %














  05/08/18 Range/Units





  15:35 


 


WBC  7.4  (4.5-11.0)  K/mm3


 


RBC  5.21 H  (3.65-5.03)  M/mm3


 


Hgb  15.7 H  (11.8-15.2)  gm/dl


 


Hct  48.6 H  (35.5-45.6)  %


 


MCV  93  (84-94)  fl


 


MCH  30  (28-32)  pg


 


MCHC  32  (32-34)  %


 


RDW  14.3  (13.2-15.2)  %


 


Plt Count  233  (140-440)  K/mm3


 


Lymph % (Auto)  12.5 L  (13.4-35.0)  %


 


Mono % (Auto)  7.5 H  (0.0-7.3)  %


 


Eos % (Auto)  0.8  (0.0-4.3)  %


 


Baso % (Auto)  0.4  (0.0-1.8)  %


 


Lymph #  0.9 L  (1.2-5.4)  K/mm3


 


Mono #  0.6  (0.0-0.8)  K/mm3


 


Eos #  0.1  (0.0-0.4)  K/mm3


 


Baso #  0.0  (0.0-0.1)  K/mm3


 


Seg Neutrophils %  78.8 H  (40.0-70.0)  %


 


Seg Neutrophils #  5.9  (1.8-7.7)  K/mm3


 


Sodium   (137-145)  mmol/L


 


Potassium   (3.6-5.0)  mmol/L


 


Chloride   ()  mmol/L


 


Carbon Dioxide   (22-30)  mmol/L


 


Anion Gap   mmol/L


 


BUN   (9-20)  mg/dL


 


Creatinine   (0.8-1.5)  mg/dL


 


Estimated GFR   ml/min


 


BUN/Creatinine Ratio   %


 


Glucose   ()  mg/dL


 


Calcium   (8.4-10.2)  mg/dL


 


Urine Color   (Yellow)  


 


Urine Turbidity   (Clear)  


 


Urine pH   (5.0-7.0)  


 


Ur Specific Gravity   (1.003-1.030)  


 


Urine Protein   (Negative)  mg/dL


 


Urine Glucose (UA)   (Negative)  mg/dL


 


Urine Ketones   (Negative)  mg/dL


 


Urine Blood   (Negative)  


 


Urine Nitrite   (Negative)  


 


Urine Bilirubin   (Negative)  


 


Urine Urobilinogen   (<2.0)  mg/dL


 


Ur Leukocyte Esterase   (Negative)  


 


Urine WBC (Auto)   (0.0-6.0)  /HPF


 


Urine RBC (Auto)   (0.0-6.0)  /HPF


 


Urine Mucus   /HPF


 


Salicylates   (2.8-20.0)  mg/dL


 


Urine Opiates Screen   


 


Urine Methadone Screen   


 


Acetaminophen   (10.0-30.0)  ug/mL


 


Ur Barbiturates Screen   


 


Ur Phencyclidine Scrn   


 


Ur Amphetamines Screen   


 


U Benzodiazepines Scrn   


 


Urine Cocaine Screen   


 


U Marijuana (THC) Screen   


 


Drugs of Abuse Note   


 


Plasma/Serum Alcohol   (0-0.07)  %














- Medical Decision Making





Patient is medically clear for psychiatric admission.  1013 has been signed.





- Differential Diagnosis


psychosis, catatonia, delusion


Critical Care Time: No


Critical care attestation.: 


If time is entered above; I have spent that time in minutes in the direct care 

of this critically ill patient, excluding procedure time.








ED Disposition


Clinical Impression: 


 Selective mutism, Bizarre behavior, Psychiatric disorder, Medical clearance 

for psychiatric admission





Disposition: DC/TX-65 PSY HOSP/PSY UNIT


Is pt being admited?: No


Condition: Stable


Time of Disposition: 20:57

## 2018-05-09 VITALS — DIASTOLIC BLOOD PRESSURE: 85 MMHG | SYSTOLIC BLOOD PRESSURE: 118 MMHG

## 2018-05-09 NOTE — CONSULTATION
History of Present Illness





- Reason for Consult


Consult date: 05/09/18


Reason for consult: Initial Psychiatric Evaluation





Medications and Allergies


 Allergies











Allergy/AdvReac Type Severity Reaction Status Date / Time


 


No Known Allergies Allergy   Verified 05/08/18 15:33











 Home Medications











 Medication  Instructions  Recorded  Confirmed  Last Taken  Type


 


Unobtainable  07/20/17 05/08/18 Unknown History














Mental Status Exam





- Vital signs


 Last Vital Signs











Temp  98.8 F   05/09/18 04:38


 


Pulse  88   05/09/18 04:38


 


Resp  18   05/09/18 04:38


 


BP  118/85   05/09/18 04:38


 


Pulse Ox  98   05/09/18 04:38














- Exam


Narrative exam: 


Mental Status Exam:


General  Appearance: Casually dressed- hospital gown  


Attitude/ Behavior:  Cooperative


Sensorium: Distracted


Orientation: Alert and oriented x 4 (person, place, time , and situation)


Psychomotor & Musculoskeletal Activity: Ambulatory-pacing


Speech: Normal rate and tone... Slow at times 


Mood: "Good" 


Affect: Constricted 


Thought Process: Circumstantial 


Thought Content: Impoverished, Paranoia (?)


Suicidal ideation/plan: Patient denies


Homicidal ideation/plan: Patient denies


Insight: Poor 


Judgment: Limited   














Results


Result Diagrams: 


 05/08/18 15:35





 05/08/18 15:35


 Abnormal lab results











  05/08/18 05/08/18 05/08/18 Range/Units





  15:35 15:35 15:35 


 


RBC     (3.65-5.03)  M/mm3


 


Hgb     (11.8-15.2)  gm/dl


 


Hct     (35.5-45.6)  %


 


Lymph % (Auto)     (13.4-35.0)  %


 


Mono % (Auto)     (0.0-7.3)  %


 


Lymph #     (1.2-5.4)  K/mm3


 


Seg Neutrophils %     (40.0-70.0)  %


 


Potassium    3.5 L  (3.6-5.0)  mmol/L


 


Chloride    97.9 L  ()  mmol/L


 


Salicylates  < 0.3 L    (2.8-20.0)  mg/dL


 


Acetaminophen   < 5.0 L   (10.0-30.0)  ug/mL














  05/08/18 Range/Units





  15:35 


 


RBC  5.21 H  (3.65-5.03)  M/mm3


 


Hgb  15.7 H  (11.8-15.2)  gm/dl


 


Hct  48.6 H  (35.5-45.6)  %


 


Lymph % (Auto)  12.5 L  (13.4-35.0)  %


 


Mono % (Auto)  7.5 H  (0.0-7.3)  %


 


Lymph #  0.9 L  (1.2-5.4)  K/mm3


 


Seg Neutrophils %  78.8 H  (40.0-70.0)  %


 


Potassium   (3.6-5.0)  mmol/L


 


Chloride   ()  mmol/L


 


Salicylates   (2.8-20.0)  mg/dL


 


Acetaminophen   (10.0-30.0)  ug/mL








All other labs normal.